# Patient Record
Sex: FEMALE | Race: WHITE | Employment: OTHER | URBAN - METROPOLITAN AREA
[De-identification: names, ages, dates, MRNs, and addresses within clinical notes are randomized per-mention and may not be internally consistent; named-entity substitution may affect disease eponyms.]

---

## 2017-02-10 ENCOUNTER — LAB ENCOUNTER (OUTPATIENT)
Dept: LAB | Age: 32
End: 2017-02-10
Attending: INTERNAL MEDICINE
Payer: MEDICARE

## 2017-02-10 DIAGNOSIS — E78.5 HYPERLIPEMIA: ICD-10-CM

## 2017-02-10 DIAGNOSIS — E13.8 MODY 4 WITH COMPLICATIONS, CONTROLLED (HCC): Primary | ICD-10-CM

## 2017-02-10 LAB
ALBUMIN SERPL-MCNC: 3.9 G/DL (ref 3.5–4.8)
ALP LIVER SERPL-CCNC: 51 U/L (ref 37–98)
ALT SERPL-CCNC: 23 U/L (ref 14–54)
AST SERPL-CCNC: 17 U/L (ref 15–41)
BILIRUB SERPL-MCNC: 0.4 MG/DL (ref 0.1–2)
BUN BLD-MCNC: 20 MG/DL (ref 8–20)
CALCIUM BLD-MCNC: 8.4 MG/DL (ref 8.3–10.3)
CHLORIDE: 105 MMOL/L (ref 101–111)
CHOLEST SMN-MCNC: 81 MG/DL (ref ?–200)
CO2: 25 MMOL/L (ref 22–32)
CREAT BLD-MCNC: 0.55 MG/DL (ref 0.55–1.02)
CREAT UR-SCNC: 85.2 MG/DL
FREE T4: 1.2 NG/DL (ref 0.9–1.8)
GLUCOSE BLD-MCNC: 83 MG/DL (ref 70–99)
HDLC SERPL-MCNC: 50 MG/DL (ref 45–?)
HDLC SERPL: 1.62 {RATIO} (ref ?–4.44)
LDLC SERPL CALC-MCNC: 20 MG/DL (ref ?–130)
M PROTEIN MFR SERPL ELPH: 7 G/DL (ref 6.1–8.3)
MICROALBUMIN UR-MCNC: 1.1 MG/DL
MICROALBUMIN/CREAT 24H UR-RTO: 12.9 UG/MG (ref ?–30)
NONHDLC SERPL-MCNC: 31 MG/DL (ref ?–130)
POTASSIUM SERPL-SCNC: 4 MMOL/L (ref 3.6–5.1)
SODIUM SERPL-SCNC: 139 MMOL/L (ref 136–144)
TRIGLYCERIDES: 56 MG/DL (ref ?–150)
TSI SER-ACNC: 3.42 MIU/ML (ref 0.35–5.5)
VLDL: 11 MG/DL (ref 5–40)

## 2017-02-10 PROCEDURE — 80061 LIPID PANEL: CPT

## 2017-02-10 PROCEDURE — 82043 UR ALBUMIN QUANTITATIVE: CPT

## 2017-02-10 PROCEDURE — 82570 ASSAY OF URINE CREATININE: CPT

## 2017-02-10 PROCEDURE — 84443 ASSAY THYROID STIM HORMONE: CPT

## 2017-02-10 PROCEDURE — 80053 COMPREHEN METABOLIC PANEL: CPT

## 2017-02-10 PROCEDURE — 36415 COLL VENOUS BLD VENIPUNCTURE: CPT

## 2017-02-10 PROCEDURE — 84439 ASSAY OF FREE THYROXINE: CPT

## 2019-04-06 ENCOUNTER — HOSPITAL ENCOUNTER (EMERGENCY)
Age: 34
Discharge: HOME | End: 2019-04-06
Payer: MEDICARE

## 2019-04-06 VITALS
SYSTOLIC BLOOD PRESSURE: 148 MMHG | HEIGHT: 59 IN | RESPIRATION RATE: 20 BRPM | WEIGHT: 160 LBS | TEMPERATURE: 97 F | OXYGEN SATURATION: 99 % | BODY MASS INDEX: 32.25 KG/M2 | HEART RATE: 130 BPM | DIASTOLIC BLOOD PRESSURE: 85 MMHG

## 2019-04-06 NOTE — ED NOTES
Pt leaving before being seen by physician, states they spoke with their doctor Gearldine Dandy and he will see them in the office monday

## 2019-04-06 NOTE — ED INITIAL ASSESSMENT (HPI)
Pt states she went to go put in her hearing aid to her right ear and it \"fell off and got stuck in my ear. They tried getting it out at home, but they couldn't\". Pt denies pain or drainage from ear.

## 2025-06-23 ENCOUNTER — TELEPHONE (OUTPATIENT)
Dept: MAMMOGRAPHY | Facility: HOSPITAL | Age: 40
End: 2025-06-23

## 2025-06-23 NOTE — TELEPHONE ENCOUNTER
Attempted to call patient re: breast wire localization procedure education. Unable to leave a message

## 2025-06-24 ENCOUNTER — TELEPHONE (OUTPATIENT)
Dept: MAMMOGRAPHY | Facility: HOSPITAL | Age: 40
End: 2025-06-24

## 2025-06-24 NOTE — TELEPHONE ENCOUNTER
Attempted to call patient re: breast wire localization education. Message left for patient to call back.

## 2025-06-30 ENCOUNTER — TELEPHONE (OUTPATIENT)
Dept: GENERAL RADIOLOGY | Facility: HOSPITAL | Age: 40
End: 2025-06-30

## 2025-07-01 ENCOUNTER — TELEPHONE (OUTPATIENT)
Dept: MAMMOGRAPHY | Facility: HOSPITAL | Age: 40
End: 2025-07-01

## 2025-07-01 NOTE — TELEPHONE ENCOUNTER
Attempted to reach patient regarding localization procedure education. Message left for patient to call back.

## 2025-07-08 ENCOUNTER — TELEPHONE (OUTPATIENT)
Dept: MAMMOGRAPHY | Facility: HOSPITAL | Age: 40
End: 2025-07-08

## 2025-07-08 NOTE — TELEPHONE ENCOUNTER
Fifth attempt to reach patient regarding localization procedure education. Message left for patient to call back.

## 2025-07-09 RX ORDER — LANCETS
EACH MISCELLANEOUS DAILY
COMMUNITY
Start: 2025-06-03

## 2025-07-09 RX ORDER — BLOOD-GLUCOSE METER
1 EACH MISCELLANEOUS DAILY
COMMUNITY
Start: 2025-07-03

## 2025-07-09 RX ORDER — EMPAGLIFLOZIN 25 MG/1
25 TABLET, FILM COATED ORAL DAILY
COMMUNITY
Start: 2025-05-20

## 2025-07-09 RX ORDER — DIAZEPAM 5 MG/1
5 TABLET ORAL EVERY 30 MIN PRN
OUTPATIENT
Start: 2025-07-09

## 2025-07-14 ENCOUNTER — TELEPHONE (OUTPATIENT)
Dept: MAMMOGRAPHY | Facility: HOSPITAL | Age: 40
End: 2025-07-14

## 2025-07-18 NOTE — PAT NURSING NOTE
Spoke w pt mom earlier today. She states does not have guardianship papers at this time. Faxed Health Care Surrogate Physician Certification document to surgeon/ office.  Received document signed per MD and scanned into media tab for Monday surgery.

## 2025-07-21 ENCOUNTER — HOSPITAL ENCOUNTER (OUTPATIENT)
Dept: MAMMOGRAPHY | Facility: HOSPITAL | Age: 40
Setting detail: HOSPITAL OUTPATIENT SURGERY
Discharge: HOME | End: 2025-07-21
Attending: SURGERY | Admitting: SURGERY
Payer: MEDICARE

## 2025-07-21 ENCOUNTER — HOSPITAL ENCOUNTER (OUTPATIENT)
Facility: HOSPITAL | Age: 40
Setting detail: HOSPITAL OUTPATIENT SURGERY
Discharge: HOME | End: 2025-07-21
Attending: SURGERY | Admitting: SURGERY
Payer: MEDICARE

## 2025-07-21 ENCOUNTER — ANESTHESIA (OUTPATIENT)
Dept: SURGERY | Facility: HOSPITAL | Age: 40
End: 2025-07-21
Payer: MEDICARE

## 2025-07-21 ENCOUNTER — ANESTHESIA EVENT (OUTPATIENT)
Dept: SURGERY | Facility: HOSPITAL | Age: 40
End: 2025-07-21
Payer: MEDICARE

## 2025-07-21 VITALS
OXYGEN SATURATION: 98 % | TEMPERATURE: 99 F | SYSTOLIC BLOOD PRESSURE: 129 MMHG | DIASTOLIC BLOOD PRESSURE: 86 MMHG | BODY MASS INDEX: 15.51 KG/M2 | RESPIRATION RATE: 16 BRPM | HEIGHT: 58 IN | WEIGHT: 73.88 LBS | HEART RATE: 117 BPM

## 2025-07-21 DIAGNOSIS — N63.10 MASS OF RIGHT BREAST, UNSPECIFIED QUADRANT: Primary | ICD-10-CM

## 2025-07-21 DIAGNOSIS — N60.99 ATYPICAL DUCTAL HYPERPLASIA OF BREAST: ICD-10-CM

## 2025-07-21 LAB
B-HCG UR QL: NEGATIVE
GLUCOSE BLD-MCNC: 120 MG/DL (ref 70–99)
GLUCOSE BLD-MCNC: 198 MG/DL (ref 70–99)

## 2025-07-21 PROCEDURE — 82962 GLUCOSE BLOOD TEST: CPT

## 2025-07-21 PROCEDURE — 19281 PERQ DEVICE BREAST 1ST IMAG: CPT | Performed by: SURGERY

## 2025-07-21 PROCEDURE — 81025 URINE PREGNANCY TEST: CPT

## 2025-07-21 PROCEDURE — 88307 TISSUE EXAM BY PATHOLOGIST: CPT | Performed by: SURGERY

## 2025-07-21 PROCEDURE — 76098 X-RAY EXAM SURGICAL SPECIMEN: CPT | Performed by: SURGERY

## 2025-07-21 PROCEDURE — 19282 PERQ DEVICE BREAST EA IMAG: CPT | Performed by: SURGERY

## 2025-07-21 PROCEDURE — 88342 IMHCHEM/IMCYTCHM 1ST ANTB: CPT | Performed by: SURGERY

## 2025-07-21 PROCEDURE — 88360 TUMOR IMMUNOHISTOCHEM/MANUAL: CPT | Performed by: SURGERY

## 2025-07-21 RX ORDER — NALOXONE HYDROCHLORIDE 0.4 MG/ML
0.08 INJECTION, SOLUTION INTRAMUSCULAR; INTRAVENOUS; SUBCUTANEOUS AS NEEDED
Status: DISCONTINUED | OUTPATIENT
Start: 2025-07-21 | End: 2025-07-21

## 2025-07-21 RX ORDER — ONDANSETRON 2 MG/ML
INJECTION INTRAMUSCULAR; INTRAVENOUS AS NEEDED
Status: DISCONTINUED | OUTPATIENT
Start: 2025-07-21 | End: 2025-07-21 | Stop reason: SURG

## 2025-07-21 RX ORDER — LIDOCAINE HYDROCHLORIDE AND EPINEPHRINE 10; 10 MG/ML; UG/ML
INJECTION, SOLUTION INFILTRATION; PERINEURAL AS NEEDED
Status: DISCONTINUED | OUTPATIENT
Start: 2025-07-21 | End: 2025-07-21 | Stop reason: HOSPADM

## 2025-07-21 RX ORDER — HYDROCODONE BITARTRATE AND ACETAMINOPHEN 5; 325 MG/1; MG/1
2 TABLET ORAL ONCE AS NEEDED
Status: DISCONTINUED | OUTPATIENT
Start: 2025-07-21 | End: 2025-07-21

## 2025-07-21 RX ORDER — SODIUM CHLORIDE, SODIUM LACTATE, POTASSIUM CHLORIDE, CALCIUM CHLORIDE 600; 310; 30; 20 MG/100ML; MG/100ML; MG/100ML; MG/100ML
INJECTION, SOLUTION INTRAVENOUS CONTINUOUS
Status: DISCONTINUED | OUTPATIENT
Start: 2025-07-21 | End: 2025-07-21

## 2025-07-21 RX ORDER — HYDROCODONE BITARTRATE AND ACETAMINOPHEN 5; 325 MG/1; MG/1
1 TABLET ORAL EVERY 4 HOURS PRN
Qty: 20 TABLET | Refills: 0 | Status: SHIPPED | OUTPATIENT
Start: 2025-07-21

## 2025-07-21 RX ORDER — NICOTINE POLACRILEX 4 MG
15 LOZENGE BUCCAL
Status: DISCONTINUED | OUTPATIENT
Start: 2025-07-21 | End: 2025-07-21 | Stop reason: HOSPADM

## 2025-07-21 RX ORDER — MIDAZOLAM HYDROCHLORIDE 1 MG/ML
1 INJECTION INTRAMUSCULAR; INTRAVENOUS EVERY 5 MIN PRN
Status: DISCONTINUED | OUTPATIENT
Start: 2025-07-21 | End: 2025-07-21

## 2025-07-21 RX ORDER — NICOTINE POLACRILEX 4 MG
30 LOZENGE BUCCAL
Status: DISCONTINUED | OUTPATIENT
Start: 2025-07-21 | End: 2025-07-21 | Stop reason: HOSPADM

## 2025-07-21 RX ORDER — ACETAMINOPHEN 500 MG
1000 TABLET ORAL ONCE AS NEEDED
Status: DISCONTINUED | OUTPATIENT
Start: 2025-07-21 | End: 2025-07-21

## 2025-07-21 RX ORDER — HYDROMORPHONE HYDROCHLORIDE 1 MG/ML
0.2 INJECTION, SOLUTION INTRAMUSCULAR; INTRAVENOUS; SUBCUTANEOUS EVERY 5 MIN PRN
Status: DISCONTINUED | OUTPATIENT
Start: 2025-07-21 | End: 2025-07-21

## 2025-07-21 RX ORDER — DEXTROSE MONOHYDRATE 25 G/50ML
50 INJECTION, SOLUTION INTRAVENOUS
Status: DISCONTINUED | OUTPATIENT
Start: 2025-07-21 | End: 2025-07-21 | Stop reason: HOSPADM

## 2025-07-21 RX ORDER — PROCHLORPERAZINE EDISYLATE 5 MG/ML
5 INJECTION INTRAMUSCULAR; INTRAVENOUS EVERY 8 HOURS PRN
Status: DISCONTINUED | OUTPATIENT
Start: 2025-07-21 | End: 2025-07-21

## 2025-07-21 RX ORDER — BUPIVACAINE HYDROCHLORIDE 5 MG/ML
INJECTION, SOLUTION EPIDURAL; INTRACAUDAL; PERINEURAL AS NEEDED
Status: DISCONTINUED | OUTPATIENT
Start: 2025-07-21 | End: 2025-07-21 | Stop reason: HOSPADM

## 2025-07-21 RX ORDER — DIPHENHYDRAMINE HYDROCHLORIDE 50 MG/ML
12.5 INJECTION, SOLUTION INTRAMUSCULAR; INTRAVENOUS AS NEEDED
Status: DISCONTINUED | OUTPATIENT
Start: 2025-07-21 | End: 2025-07-21

## 2025-07-21 RX ORDER — ONDANSETRON 2 MG/ML
4 INJECTION INTRAMUSCULAR; INTRAVENOUS EVERY 6 HOURS PRN
Status: DISCONTINUED | OUTPATIENT
Start: 2025-07-21 | End: 2025-07-21

## 2025-07-21 RX ORDER — LIDOCAINE HYDROCHLORIDE 10 MG/ML
INJECTION, SOLUTION EPIDURAL; INFILTRATION; INTRACAUDAL; PERINEURAL AS NEEDED
Status: DISCONTINUED | OUTPATIENT
Start: 2025-07-21 | End: 2025-07-21 | Stop reason: SURG

## 2025-07-21 RX ORDER — INSULIN ASPART 100 [IU]/ML
INJECTION, SOLUTION INTRAVENOUS; SUBCUTANEOUS ONCE
Status: DISCONTINUED | OUTPATIENT
Start: 2025-07-21 | End: 2025-07-21

## 2025-07-21 RX ORDER — MEPERIDINE HYDROCHLORIDE 25 MG/ML
12.5 INJECTION INTRAMUSCULAR; INTRAVENOUS; SUBCUTANEOUS AS NEEDED
Status: DISCONTINUED | OUTPATIENT
Start: 2025-07-21 | End: 2025-07-21

## 2025-07-21 RX ORDER — SCOPOLAMINE 1 MG/3D
1 PATCH, EXTENDED RELEASE TRANSDERMAL ONCE
Status: DISCONTINUED | OUTPATIENT
Start: 2025-07-21 | End: 2025-07-21 | Stop reason: HOSPADM

## 2025-07-21 RX ORDER — HYDROMORPHONE HYDROCHLORIDE 1 MG/ML
0.4 INJECTION, SOLUTION INTRAMUSCULAR; INTRAVENOUS; SUBCUTANEOUS EVERY 5 MIN PRN
Status: DISCONTINUED | OUTPATIENT
Start: 2025-07-21 | End: 2025-07-21

## 2025-07-21 RX ORDER — DEXAMETHASONE SODIUM PHOSPHATE 4 MG/ML
VIAL (ML) INJECTION AS NEEDED
Status: DISCONTINUED | OUTPATIENT
Start: 2025-07-21 | End: 2025-07-21 | Stop reason: SURG

## 2025-07-21 RX ORDER — ACETAMINOPHEN 500 MG
1000 TABLET ORAL ONCE
Status: DISCONTINUED | OUTPATIENT
Start: 2025-07-21 | End: 2025-07-21 | Stop reason: HOSPADM

## 2025-07-21 RX ORDER — LABETALOL HYDROCHLORIDE 5 MG/ML
5 INJECTION, SOLUTION INTRAVENOUS EVERY 5 MIN PRN
Status: DISCONTINUED | OUTPATIENT
Start: 2025-07-21 | End: 2025-07-21

## 2025-07-21 RX ORDER — HYDROCODONE BITARTRATE AND ACETAMINOPHEN 5; 325 MG/1; MG/1
1 TABLET ORAL ONCE AS NEEDED
Status: DISCONTINUED | OUTPATIENT
Start: 2025-07-21 | End: 2025-07-21

## 2025-07-21 RX ORDER — HYDROMORPHONE HYDROCHLORIDE 1 MG/ML
0.6 INJECTION, SOLUTION INTRAMUSCULAR; INTRAVENOUS; SUBCUTANEOUS EVERY 5 MIN PRN
Status: DISCONTINUED | OUTPATIENT
Start: 2025-07-21 | End: 2025-07-21

## 2025-07-21 RX ADMIN — ONDANSETRON 4 MG: 2 INJECTION INTRAMUSCULAR; INTRAVENOUS at 13:15:00

## 2025-07-21 RX ADMIN — SODIUM CHLORIDE, SODIUM LACTATE, POTASSIUM CHLORIDE, CALCIUM CHLORIDE: 600; 310; 30; 20 INJECTION, SOLUTION INTRAVENOUS at 13:19:00

## 2025-07-21 RX ADMIN — LIDOCAINE HYDROCHLORIDE 5 ML: 10 INJECTION, SOLUTION EPIDURAL; INFILTRATION; INTRACAUDAL; PERINEURAL at 12:54:00

## 2025-07-21 RX ADMIN — SODIUM CHLORIDE, SODIUM LACTATE, POTASSIUM CHLORIDE, CALCIUM CHLORIDE: 600; 310; 30; 20 INJECTION, SOLUTION INTRAVENOUS at 12:48:00

## 2025-07-21 RX ADMIN — DEXAMETHASONE SODIUM PHOSPHATE 4 MG: 4 MG/ML VIAL (ML) INJECTION at 12:56:00

## 2025-07-21 NOTE — ANESTHESIA POSTPROCEDURE EVALUATION
[facility]    Aliya Mata Patient Status:  Hospital Outpatient Surgery   Age/Gender 40 year old female MRN [id number]   Location [facility] POST ANESTHESIA CARE UNIT Attending Juno Reza MD   Hosp Day # 0 PCP Em Yoder DO       Anesthesia Post-op Note    RIGHT BREAST LUMPECTOMY WITH WIRE LOCALIZATION TWO WIRES (TWO SITES)    Procedure Summary       Date: 07/21/25 Room / Location:  MAIN OR 08 / [facility] MAIN OR    Anesthesia Start: 1248 Anesthesia Stop: 1338    Procedure: RIGHT BREAST LUMPECTOMY WITH WIRE LOCALIZATION TWO WIRES (TWO SITES) (Breast) Diagnosis: (ATYPICAL DUCTAL HYPERPLASIA RIGHT BREAST)    Surgeons: Juno Reza MD Anesthesiologist: Christophe Renteria MD    Anesthesia Type: general ASA Status: 2            Anesthesia Type: general    Vitals Value Taken Time   /87 07/21/25 13:55   Temp 98.6 °F (37 °C) 07/21/25 14:00   Pulse 110 07/21/25 14:00   Resp 19 07/21/25 14:00   SpO2 98 % 07/21/25 14:00           Patient Location: PACU    Anesthesia Type: general    Airway Patency: patent and extubated    Postop Pain Control: adequate    Mental Status: preanesthetic baseline    Nausea/Vomiting: none    Cardiopulmonary/Hydration status: stable euvolemic    Complications: no apparent anesthesia related complications    Postop vital signs: stable    Dental Exam: Unchanged from Preop    Patient to be discharged from PACU when criteria met.

## 2025-07-21 NOTE — IMAGING NOTE
Assisted  with mammography guided needle localization of the right breast x two sites  Aliya Mata identified with spelling of name and date of birth.  Medications and allergies reviewed.  NKDA reported.     History: right breast-atypical ductal hyperplasia   Surgery: RIGHT BREAST LUMPECTOMY WITH WIRE LOCALIZATION TWO WIRES (TWO SITES)     Order verified.  Per [facility]- Aliya Mata has a Health Care Surrogate. See media for health care surrogate physician certification.  1033: phone conversation: needle localization procedure of the breast discussed very briefly with Health Care Surrogate Dina Avilapau (Mother)  Dina Durantmj verbalized understanding   Dina Vinod firmly informed this nurse that her daughter Aliya Mata provides self consent for health care procedures.  Procedure explained including risks of infection and bleeding to Aliya Mata. Ms. Mata verbalized understanding. No questions at this time.   1035: Written consent obtained by Aliya Mata.     1042: Scans taken by Elaine- mammography technologist    1045: Dr. Wray present  Dr. Wray briefly discussed breast localization procedure with Aliya Mata. Ms. Mata verbalized understanding and agreement to proceed. No questions.     1046: Time out complete.    Right Breast  1045: Site prepped in a sterile manner.   1047: Lidocaine administered for anesthetic affect.  1047: [place] 20G x 7.5cm needle placed right breast cork shaped marker  Right Breast   1045: Site prepped in a sterile manner.   1048: Lidocaine administered for anesthetic affect.  1048: [place] 20G x 5cm needle placed right breast buckle shaped marker    Emotional support provided.  Aliya Mata tolerated procedure well.     Sites cleaned.  Wires secured with sterile 4x4 gauze dressing, paper tape, and a styrofoam cup    Aliya Mata transported via wheelchair to pre-op/surgery holding in stable condition. Ms. Mata without complaints or concerns at this  time.

## 2025-07-21 NOTE — DISCHARGE INSTRUCTIONS
Home Care Instructions  BREAST BIOPSY      1. You may have an ace wrap around your chest. This may be removed in 24 hours. You have a clear plastic dressing called a “Tegaderm” covering your incision. You may shower or bathe right over this. Leave this in place until you are seen back in the office. You may note some drainage underneath this dressing. Pink or bloody discharge is to be expected and is acceptable in small amounts.    2. Anticipate some mild to moderate swelling and bruising. This is normal. If this appears excessive please contact the office  [phone]    3. The pathology or biopsy results take 2-3 days to process. Please call the office 2 days after surgery for the results and further instructions will be given. Also at that time make an appointment for approximately one week after surgery. At this office visit the dressing will be removed, the wound inspected, and the stitches removed. Also during this visit there will be a complete discussion regarding the biopsy results.    4. You have been given a prescription for a pain reliever. Please take this as necessary. If you are having minimal discomfort, regular Tylenol or aspirin could be used as necessary. You may or may not choose to continue to wear your bra. Please use according to your own comfort level. You may start/continue with NSAIDS (antiinflammatories) in 2 days or as directed     5. You may eat a diet as tolerated    6. Signs and symptoms of wound problems include a yellowish or malodorous drainage underneath the bandage, increasing redness, or increasing pain and tenderness. If these occur please call immediately.    7. Please call if you have any problems or questions. One of my partners or I will be available at anytime day or night by calling our office number  [phone]      [facility]   [phone]

## 2025-07-21 NOTE — H&P
Aliya Mata is a 39 year old female who presents for a breast evaluation   Recent mammogram and ultrasound reveals 4 abnormalities right breast and 1 on the left  All 5 sites were biopsied  2 areas right breast were positive for ADH  All other were benign  Here with mother     Past Medical History:   Diagnosis Date   Anxiety   Asthma   Breast injury 2010   dog bite   Diabetes (HCC)   Essential hypertension   Mild episode of recurrent major depressive disorder (HCC) 09/25/2023   Scoliosis     Past Surgical History:   Procedure Laterality Date   ANESTHESIA EYE CORNEAL TRANSPLANT   OTHER SURGICAL HISTORY Left 1986   cornea transplant x2   OTHER SURGICAL HISTORY Left 1991   foot     Current Outpatient Medications   Medication Sig Dispense Refill   metFORMIN  MG Oral Tablet 24 Hr Take 2 tablets (1,000 mg total) by mouth 2 (two) times daily with meals. 180 tablet 1   Semaglutide, 2 MG/DOSE, (OZEMPIC, 2 MG/DOSE,) 8 MG/3ML Subcutaneous Solution Pen-injector Inject 2 mg into the skin once a week. 9 mL 0   Accu-Chek FastClix Lancets Does not apply Misc Check blood sugars daily 102 each 3   Glucose Blood (ACCU-CHEK GUIDE TEST) In Vitro Strip Check blood sugar daily 100 strip 3   Continuous Glucose Sensor (DEXCOM G7 SENSOR) Does not apply Misc 1 each Every 10 days. 9 each 1   Empagliflozin (JARDIANCE) 25 MG Oral Tab Take 1 tablet by mouth daily. 90 tablet 1   lisinopril 5 MG Oral Tab Take 1 tablet (5 mg total) by mouth daily. 100 tablet 3   montelukast 10 MG Oral Tab Take 1 tablet (10 mg total) by mouth nightly. 90 tablet 3   citalopram 20 MG Oral Tab Take 1 tablet (20 mg total) by mouth daily. 90 tablet 3   Blood Glucose Monitoring Suppl (ACCU-CHEK GUIDE) w/Device Does not apply Kit Check blood sugars daily 1 kit 0     No Known Allergies    Family History   Problem Relation Age of Onset   Breast Cancer Maternal Aunt 51 - 59   51-59y     Social History  Tobacco Use  Smoking status: Never  Smokeless tobacco:  Never  Vaping Use  Vaping status: Never Used  Alcohol use: Never  Drug use: Never    ROS:    10 point review performed with pertinent positives and negatives per HPI    EXAM:    GENERAL: well developed, well nourished female, in no apparent distress  SKIN: anicteric  HEENT: normocephalic; sclera anicteric  NECK: supple, no JVD  RESPIRATORY: clear to auscultation  CARDIOVASCULAR: RRR  ABDOMEN: normal active BS, soft and no tenderness, no mass  LYMPHATIC: no lymphadenopathy  EXTREMITIES: no cyanosis or edema  BREASTS: post biopsy changes both breast  No suspicious mass either breast  Axilla negative    BREAST US BIOPSY 2 SITES (CPT=19083/24882)  Addendum Date: 6/11/2025  DATE OF SERVICE: 05.27.2025 Right breast 12:00 posterior calcifications core biopsy (cork clip): Atypical ductal hyperplasia. This report electronically signed by Dr. Jefry Ragsdale 6/11/2025 9:59 AM    Addendum Date: 5/30/2025  DATE OF SERVICE: 05.27.2025 FINAL PATHOLOGIC DIAGNOSIS Right breast 12:00 posterior calcifications core biopsy: Atypical ductal hyperplasia. Left breast 5:00 3 cm from nipple core biopsy: Fibroadenoma. Right breast 11:00 retroareolar core biopsy: Fibroadenoma. These results are benign and concordant. 2 additional clusters of calcifications in the anterior right breast should be sampled with stereotactic biopsy given the results of atypia. Surgical consultation. These results will be communicated to the patient by our breast care coordinator. This can be localized under stereotactic guidance. This report electronically signed by Dr. Jefry Ragsdale 5/30/2025 11:19 AM    Result Date: 6/11/2025  DATE OF SERVICE: 05.27.2025 EXAM: BREAST US BIOPSY 2 SITES (CPT=19083/82245), BREAST STEREO BIOPSY 1 SITE (CPT=19081), DIAG MAMM,DIGITAL,LILINAA CLIP [facility](CPT=77066) CLINICIAN'S HISTORY: 2 site right and one site left biopsy COMPARISON: 5/15/2025 TECHNIQUE: Ultrasound-guided core needle biopsy of both breasts with marker placement.  Stereotactic biopsy of the right breast. Post procedure mammogram. ---------------------------------------- PROCEDURE: Informed written and verbal consent has been obtained. The risks, benefits and alternatives to the procedures have been discussed with the patient. The patient or designee understands and has signed the consent. Timeout was performed. Preprocedure ultrasound demonstrated a mass in the right breast at the 11:00 position retroareolar region and in the left breast at the 5:00 position 3 cm from the nipple. A sterile surgical supply tray was used for the procedure. The patient was positioned on the ultrasound table with the ipsilateral arm raised over the head. Right breast 11:00: The breast was prepped with sterile technique. Under continuous ultrasound guidance, 1% Lidocaine buffered with sodium bicarbonate was injected for superficial anesthesia and to anesthetize the area surrounding the ultrasonographic lesion. Additional anesthesia with 1% Lidocaine with Epinephrine was also used within the deep tissues. A small skin nick was made with a # 11 scalpel blade. Under continuous ultrasound guidance, utilizing the Marquee 14 gauge device, 3 cores were obtained of the lesion. The device was then withdrawn. Under continuous ultrasound guidance, an X clip was placed into the central aspect of the lesion. The needle was then withdrawn. Left breast 5:00: The breast was prepped with sterile technique. Under continuous ultrasound guidance, 1% Lidocaine buffered with sodium bicarbonate was injected for superficial anesthesia and to anesthetize the area surrounding the ultrasonographic lesion. Additional anesthesia with 1% Lidocaine with Epinephrine was also used within the deep tissues. A small skin nick was made with a # 11 scalpel blade. Under continuous ultrasound guidance, utilizing the Marquee 14 gauge device, 3 cores were obtained of the lesion. The device was then withdrawn. Under continuous ultrasound  guidance, a Q clip was placed into the central aspect of the lesion. The needle was then withdrawn. The skin incisions were approximated with Steri-Strips. A gauze pressure dressing was placed over the biopsy site and an ice pack and bra were subsequently positioned over the dressings. Right breast stereotactic biopsy at 12:00 posterior depth. The patient was then positioned within the upright Affirm biopsy device and tomosynthesis images were obtained of the calcifications in the right upper breast, 12:00, posterior depth. After the appropriate images were obtained, the breast lesion was targeted from the digital tomosynthesis images with computer assistance. Z position safety calculations were calculated and confirmed. The patient was prepped with sterile technique. 1% lidocaine was instilled to anesthetize the skin and 2% lidocaine with epinephrine was instilled to anesthetize the area surrounding the targeted lesion. A tiny incision was made in the skin with a scalpel. A 9-gauge Brevera biopsy probe was inserted through the incision to the coordinates of the lesion derived from the tomosynthesis calculations. Tomosynthesis images were obtained to confirm accurate positioning of the biopsy probe. A total of 3 tissue specimens were extracted. The tissue specimens were radiographed using magnification technique. Specimen Radiograph: Microcalcifications were present in 3 cores. Post biopsy 2 view mammogram demonstrated the clips in the expected location. Breast Density C: The breasts are heterogeneously dense, which may obscure small masses. Post-biopsy instructions were reviewed and a copy given to the patient. A pathology request was made. She was discharged in a stable condition with all questions answered. ----------------------------------------    IMPRESSION: 1. Successful ultrasound guided core needle biopsy of both breasts with clip placement as detailed above. Pathology is currently pending. 2. Successful  stereotactic biopsy of the right breast with clip placement as detailed above. Pathology is currently pending. ASSESSMENT: BIRADS - Post procedure mammogram for marker placement. This exam was interpreted by Jefry Ragsdale M.D. [facility], [place], [address] This report electronically signed by Dr. Jefry Ragsdale 5/27/2025 10:36 AM    BREAST STEREO BIOPSY 1 SITE (CPT=19081)  Addendum Date: 6/11/2025  DATE OF SERVICE: 05.27.2025 Right breast 12:00 posterior calcifications core biopsy (cork clip): Atypical ductal hyperplasia. This report electronically signed by Dr. Jefry Ragsdale 6/11/2025 9:59 AM    Addendum Date: 5/30/2025  DATE OF SERVICE: 05.27.2025 FINAL PATHOLOGIC DIAGNOSIS Right breast 12:00 posterior calcifications core biopsy: Atypical ductal hyperplasia. Left breast 5:00 3 cm from nipple core biopsy: Fibroadenoma. Right breast 11:00 retroareolar core biopsy: Fibroadenoma. These results are benign and concordant. 2 additional clusters of calcifications in the anterior right breast should be sampled with stereotactic biopsy given the results of atypia. Surgical consultation. These results will be communicated to the patient by our breast care coordinator. This can be localized under stereotactic guidance. This report electronically signed by Dr. Jefry Ragsdale 5/30/2025 11:19 AM    Result Date: 6/11/2025  DATE OF SERVICE: 05.27.2025 EXAM: BREAST US BIOPSY 2 SITES (CPT=19083/01928), BREAST STEREO BIOPSY 1 SITE (CPT=19081), DIAG MAMM,DIGITAL,LILIANA CLIP [facility](CPT=77066) CLINICIAN'S HISTORY: 2 site right and one site left biopsy COMPARISON: 5/15/2025 TECHNIQUE: Ultrasound-guided core needle biopsy of both breasts with marker placement. Stereotactic biopsy of the right breast. Post procedure mammogram. ---------------------------------------- PROCEDURE: Informed written and verbal consent has been obtained. The risks, benefits and alternatives to the procedures have been  discussed with the patient. The patient or designee understands and has signed the consent. Timeout was performed. Preprocedure ultrasound demonstrated a mass in the right breast at the 11:00 position retroareolar region and in the left breast at the 5:00 position 3 cm from the nipple. A sterile surgical supply tray was used for the procedure. The patient was positioned on the ultrasound table with the ipsilateral arm raised over the head. Right breast 11:00: The breast was prepped with sterile technique. Under continuous ultrasound guidance, 1% Lidocaine buffered with sodium bicarbonate was injected for superficial anesthesia and to anesthetize the area surrounding the ultrasonographic lesion. Additional anesthesia with 1% Lidocaine with Epinephrine was also used within the deep tissues. A small skin nick was made with a # 11 scalpel blade. Under continuous ultrasound guidance, utilizing the Marquee 14 gauge device, 3 cores were obtained of the lesion. The device was then withdrawn. Under continuous ultrasound guidance, an X clip was placed into the central aspect of the lesion. The needle was then withdrawn. Left breast 5:00: The breast was prepped with sterile technique. Under continuous ultrasound guidance, 1% Lidocaine buffered with sodium bicarbonate was injected for superficial anesthesia and to anesthetize the area surrounding the ultrasonographic lesion. Additional anesthesia with 1% Lidocaine with Epinephrine was also used within the deep tissues. A small skin nick was made with a # 11 scalpel blade. Under continuous ultrasound guidance, utilizing the Marquee 14 gauge device, 3 cores were obtained of the lesion. The device was then withdrawn. Under continuous ultrasound guidance, a Q clip was placed into the central aspect of the lesion. The needle was then withdrawn. The skin incisions were approximated with Steri-Strips. A gauze pressure dressing was placed over the biopsy site and an ice pack and bra  were subsequently positioned over the dressings. Right breast stereotactic biopsy at 12:00 posterior depth. The patient was then positioned within the upright Affirm biopsy device and tomosynthesis images were obtained of the calcifications in the right upper breast, 12:00, posterior depth. After the appropriate images were obtained, the breast lesion was targeted from the digital tomosynthesis images with computer assistance. Z position safety calculations were calculated and confirmed. The patient was prepped with sterile technique. 1% lidocaine was instilled to anesthetize the skin and 2% lidocaine with epinephrine was instilled to anesthetize the area surrounding the targeted lesion. A tiny incision was made in the skin with a scalpel. A 9-gauge Brevera biopsy probe was inserted through the incision to the coordinates of the lesion derived from the tomosynthesis calculations. Tomosynthesis images were obtained to confirm accurate positioning of the biopsy probe. A total of 3 tissue specimens were extracted. The tissue specimens were radiographed using magnification technique. Specimen Radiograph: Microcalcifications were present in 3 cores. Post biopsy 2 view mammogram demonstrated the clips in the expected location. Breast Density C: The breasts are heterogeneously dense, which may obscure small masses. Post-biopsy instructions were reviewed and a copy given to the patient. A pathology request was made. She was discharged in a stable condition with all questions answered. ----------------------------------------    IMPRESSION: 1. Successful ultrasound guided core needle biopsy of both breasts with clip placement as detailed above. Pathology is currently pending. 2. Successful stereotactic biopsy of the right breast with clip placement as detailed above. Pathology is currently pending. ASSESSMENT: BIRADS - Post procedure mammogram for marker placement. This exam was interpreted by ANTONIA Feldman  [facility] [place], [place], [address] This report electronically signed by Dr. Jefry Ragsdale 5/27/2025 10:36 AM    DIAG MAMM,DIGITAL,LILIANA CLIP [facility](CPT=77066)  Addendum Date: 6/11/2025  DATE OF SERVICE: 05.27.2025 Right breast 12:00 posterior calcifications core biopsy (cork clip): Atypical ductal hyperplasia. This report electronically signed by Dr. Jefry Ragsdale 6/11/2025 9:59 AM    Addendum Date: 5/30/2025  DATE OF SERVICE: 05.27.2025 FINAL PATHOLOGIC DIAGNOSIS Right breast 12:00 posterior calcifications core biopsy: Atypical ductal hyperplasia. Left breast 5:00 3 cm from nipple core biopsy: Fibroadenoma. Right breast 11:00 retroareolar core biopsy: Fibroadenoma. These results are benign and concordant. 2 additional clusters of calcifications in the anterior right breast should be sampled with stereotactic biopsy given the results of atypia. Surgical consultation. These results will be communicated to the patient by our breast care coordinator. This can be localized under stereotactic guidance. This report electronically signed by Dr. Jefry Ragsdale 5/30/2025 11:19 AM    Result Date: 6/11/2025  DATE OF SERVICE: 05.27.2025 EXAM: BREAST US BIOPSY 2 SITES (CPT=19083/25715), BREAST STEREO BIOPSY 1 SITE (CPT=19081), DIAG MAMM,DIGITAL,LILIANA CLIP [facility](CPT=77066) CLINICIAN'S HISTORY: 2 site right and one site left biopsy COMPARISON: 5/15/2025 TECHNIQUE: Ultrasound-guided core needle biopsy of both breasts with marker placement. Stereotactic biopsy of the right breast. Post procedure mammogram. ---------------------------------------- PROCEDURE: Informed written and verbal consent has been obtained. The risks, benefits and alternatives to the procedures have been discussed with the patient. The patient or designee understands and has signed the consent. Timeout was performed. Preprocedure ultrasound demonstrated a mass in the right breast at the 11:00 position retroareolar region and in the left  breast at the 5:00 position 3 cm from the nipple. A sterile surgical supply tray was used for the procedure. The patient was positioned on the ultrasound table with the ipsilateral arm raised over the head. Right breast 11:00: The breast was prepped with sterile technique. Under continuous ultrasound guidance, 1% Lidocaine buffered with sodium bicarbonate was injected for superficial anesthesia and to anesthetize the area surrounding the ultrasonographic lesion. Additional anesthesia with 1% Lidocaine with Epinephrine was also used within the deep tissues. A small skin nick was made with a # 11 scalpel blade. Under continuous ultrasound guidance, utilizing the Marquee 14 gauge device, 3 cores were obtained of the lesion. The device was then withdrawn. Under continuous ultrasound guidance, an X clip was placed into the central aspect of the lesion. The needle was then withdrawn. Left breast 5:00: The breast was prepped with sterile technique. Under continuous ultrasound guidance, 1% Lidocaine buffered with sodium bicarbonate was injected for superficial anesthesia and to anesthetize the area surrounding the ultrasonographic lesion. Additional anesthesia with 1% Lidocaine with Epinephrine was also used within the deep tissues. A small skin nick was made with a # 11 scalpel blade. Under continuous ultrasound guidance, utilizing the Marquee 14 gauge device, 3 cores were obtained of the lesion. The device was then withdrawn. Under continuous ultrasound guidance, a Q clip was placed into the central aspect of the lesion. The needle was then withdrawn. The skin incisions were approximated with Steri-Strips. A gauze pressure dressing was placed over the biopsy site and an ice pack and bra were subsequently positioned over the dressings. Right breast stereotactic biopsy at 12:00 posterior depth. The patient was then positioned within the upright Affirm biopsy device and tomosynthesis images were obtained of the  calcifications in the right upper breast, 12:00, posterior depth. After the appropriate images were obtained, the breast lesion was targeted from the digital tomosynthesis images with computer assistance. Z position safety calculations were calculated and confirmed. The patient was prepped with sterile technique. 1% lidocaine was instilled to anesthetize the skin and 2% lidocaine with epinephrine was instilled to anesthetize the area surrounding the targeted lesion. A tiny incision was made in the skin with a scalpel. A 9-gauge Brevera biopsy probe was inserted through the incision to the coordinates of the lesion derived from the tomosynthesis calculations. Tomosynthesis images were obtained to confirm accurate positioning of the biopsy probe. A total of 3 tissue specimens were extracted. The tissue specimens were radiographed using magnification technique. Specimen Radiograph: Microcalcifications were present in 3 cores. Post biopsy 2 view mammogram demonstrated the clips in the expected location. Breast Density C: The breasts are heterogeneously dense, which may obscure small masses. Post-biopsy instructions were reviewed and a copy given to the patient. A pathology request was made. She was discharged in a stable condition with all questions answered. ----------------------------------------    IMPRESSION: 1. Successful ultrasound guided core needle biopsy of both breasts with clip placement as detailed above. Pathology is currently pending. 2. Successful stereotactic biopsy of the right breast with clip placement as detailed above. Pathology is currently pending. ASSESSMENT: BIRADS - Post procedure mammogram for marker placement. This exam was interpreted by Jefry Ragsdale M.D. [facility] [place], [place], [address] This report electronically signed by Dr. Jefry Ragsdale 5/27/2025 10:36 AM    BREAST STEREO BIOPSY 2 SITES (CPT=19081/28523)  Addendum Date: 6/6/2025  DATE OF SERVICE:  06.03.2025 FINAL PATHOLOGIC DIAGNOSIS Right breast 12:00 mid depth stereotactic core biopsy (buckle): Atypical ductal hyperplasia. Right breast 11:00 stereotactic core biopsy (top hat): Columnar cell change with associated calcifications. These results are benign and concordant. A breast surgical consultation is recommended. These results will be communicated to the patient by our breast care coordinator. This can be localized under stereotactic guidance along with additional area of biopsy proven atypia more posteriorly (cork clip). This report electronically signed by Dr. Jefry Ragsdale 6/6/2025 8:44 AM    Result Date: 6/6/2025  DATE OF SERVICE: 06.03.2025 EXAM: DIAG MAMM,DIGITAL,UNI RT CLIP [facility] (CPT=77065), BREAST STEREO BIOPSY 2 SITES (CPT=19081/18746) HISTORY: 2 site right breast biopsy ---------------------------------------- PROCEDURE: After explaining the risks, benefits, and alternatives to the patient, informed, written and verbal consent was obtained. Timeout was performed. Right breast 12:00 mid buckle clip: The patient was then positioned within the upright Affirm biopsy device and tomosynthesis images were obtained of the calcifications in the right upper breast, 12:00, mid. After the appropriate images were obtained, the breast lesion was targeted from the digital tomosynthesis images with computer assistance. Z position safety calculations were calculated and confirmed. The patient was prepped with sterile technique. 1% lidocaine was instilled to anesthetize the skin and 2% lidocaine with epinephrine was instilled to anesthetize the area surrounding the targeted lesion. A tiny incision was made in the skin with a scalpel. A 9-gauge Brevera biopsy probe was inserted through the incision to the coordinates of the lesion derived from the tomosynthesis calculations. Tomosynthesis images were obtained to confirm accurate positioning of the biopsy probe. A total of 2 tissue specimens were extracted. The  tissue specimens were radiographed using magnification technique. Specimen Radiograph: Microcalcifications were present in 2 cores. A buckle clip was placed at the biopsy site. Right breast 11:00 mid ectopic clip: The patient was then positioned within the upright Affirm biopsy device and tomosynthesis images were obtained of the calcifications in the right upper breast, 11:00, mid. After the appropriate images were obtained, the breast lesion was targeted from the digital tomosynthesis images with computer assistance. Z position safety calculations were calculated and confirmed. The patient was prepped with sterile technique. 1% lidocaine was instilled to anesthetize the skin and 2% lidocaine with epinephrine was instilled to anesthetize the area surrounding the targeted lesion. A tiny incision was made in the skin with a scalpel. A 9-gauge Brevera biopsy probe was inserted through the incision to the coordinates of the lesion derived from the tomosynthesis calculations. Tomosynthesis images were obtained to confirm accurate positioning of the biopsy probe. A total of 2 tissue specimens were extracted. The tissue specimens were radiographed using magnification technique. Specimen Radiograph: Microcalcifications were present in 2 cores. A TOPHAT clip was placed at the biopsy site. Post-biopsy unilateral mammogram demonstrated the clips in the expected location. Clips are  by 1.5 cm in the transverse dimension. Buckle clip is located 3.2 cm from the previously placed cork clip.. A few residual calcifications are present. Expected postbiopsy changes are present with no significant post procedure hematoma identified. Breast Density C: The breasts are heterogeneously dense, which may obscure small masses. Pressure was applied to the biopsy site until all bleeding had stopped. The skin incision was approximated with steristrips. An ice pack was applied to the site. Instructions for post-biopsy care and follow-up  were reviewed with the patient and a copy was given to her. The patient tolerated the procedure well and left the department in stable condition. Upon completion of the procedure, the specimens were submitted to Pathology for histologic analysis. ----------------------------------------    IMPRESSION: Successful stereotactic core biopsy of the right breast with clip placement as detailed above x2. Pathology is currently pending. ASSESSMENT: BIRADS - Post procedure mammogram for marker placement. This exam was interpreted by Jefry Ragsdale M.D. [facility], [place], [address] This report electronically signed by Dr. Jefry Ragsdale 6/3/2025 11:26 AM    DIAG MAMM,DIGITAL,UNI RT CLIP PLCMT (CPT=77065)  Addendum Date: 6/6/2025  DATE OF SERVICE: 06.03.2025 FINAL PATHOLOGIC DIAGNOSIS Right breast 12:00 mid depth stereotactic core biopsy (buckle): Atypical ductal hyperplasia. Right breast 11:00 stereotactic core biopsy (top hat): Columnar cell change with associated calcifications. These results are benign and concordant. A breast surgical consultation is recommended. These results will be communicated to the patient by our breast care coordinator. This can be localized under stereotactic guidance along with additional area of biopsy proven atypia more posteriorly (cork clip). This report electronically signed by Dr. Jefry Ragsdale 6/6/2025 8:44 AM    Result Date: 6/6/2025  DATE OF SERVICE: 06.03.2025 EXAM: DIAG MAMM,DIGITAL,UNI RT CLIP PLCMT (CPT=77065), BREAST STEREO BIOPSY 2 SITES (CPT=19081/10370) HISTORY: 2 site right breast biopsy ---------------------------------------- PROCEDURE: After explaining the risks, benefits, and alternatives to the patient, informed, written and verbal consent was obtained. Timeout was performed. Right breast 12:00 mid buckle clip: The patient was then positioned within the upright Affirm biopsy device and tomosynthesis images were obtained of the  calcifications in the right upper breast, 12:00, mid. After the appropriate images were obtained, the breast lesion was targeted from the digital tomosynthesis images with computer assistance. Z position safety calculations were calculated and confirmed. The patient was prepped with sterile technique. 1% lidocaine was instilled to anesthetize the skin and 2% lidocaine with epinephrine was instilled to anesthetize the area surrounding the targeted lesion. A tiny incision was made in the skin with a scalpel. A 9-gauge Brevera biopsy probe was inserted through the incision to the coordinates of the lesion derived from the tomosynthesis calculations. Tomosynthesis images were obtained to confirm accurate positioning of the biopsy probe. A total of 2 tissue specimens were extracted. The tissue specimens were radiographed using magnification technique. Specimen Radiograph: Microcalcifications were present in 2 cores. A buckle clip was placed at the biopsy site. Right breast 11:00 mid ectopic clip: The patient was then positioned within the upright Affirm biopsy device and tomosynthesis images were obtained of the calcifications in the right upper breast, 11:00, mid. After the appropriate images were obtained, the breast lesion was targeted from the digital tomosynthesis images with computer assistance. Z position safety calculations were calculated and confirmed. The patient was prepped with sterile technique. 1% lidocaine was instilled to anesthetize the skin and 2% lidocaine with epinephrine was instilled to anesthetize the area surrounding the targeted lesion. A tiny incision was made in the skin with a scalpel. A 9-gauge Brevera biopsy probe was inserted through the incision to the coordinates of the lesion derived from the tomosynthesis calculations. Tomosynthesis images were obtained to confirm accurate positioning of the biopsy probe. A total of 2 tissue specimens were extracted. The tissue specimens were  radiographed using magnification technique. Specimen Radiograph: Microcalcifications were present in 2 cores. A TOPHAT clip was placed at the biopsy site. Post-biopsy unilateral mammogram demonstrated the clips in the expected location. Clips are  by 1.5 cm in the transverse dimension. Buckle clip is located 3.2 cm from the previously placed cork clip.. A few residual calcifications are present. Expected postbiopsy changes are present with no significant post procedure hematoma identified. Breast Density C: The breasts are heterogeneously dense, which may obscure small masses. Pressure was applied to the biopsy site until all bleeding had stopped. The skin incision was approximated with steristrips. An ice pack was applied to the site. Instructions for post-biopsy care and follow-up were reviewed with the patient and a copy was given to her. The patient tolerated the procedure well and left the department in stable condition. Upon completion of the procedure, the specimens were submitted to Pathology for histologic analysis. ----------------------------------------    IMPRESSION: Successful stereotactic core biopsy of the right breast with clip placement as detailed above x2. Pathology is currently pending. ASSESSMENT: BIRADS - Post procedure mammogram for marker placement. This exam was interpreted by Jefry Ragsdale M.D. [facility] [place], [place], [address] This report electronically signed by Dr. Jefry Ragsdale 6/3/2025 11:26 AM    RUDY DIAG MAMM, DIGITAL BILAT (CPT=77066)  Result Date: 5/15/2025  DATE OF SERVICE: 05.15.2025 BILATERAL DIAGNOSTIC MAMMOGRAM WITH CAD AND TOMOSYNTHESIS AND BILATERAL BREAST ULTRASOUND CLINICAL INDICATION: Follow-up bilateral breast asymmetries and right breast calcifications. COMPARISON: All prior mammograms dating back to None TECHNIQUE: Bilateral diagnostic digital mammographic views. Images were checked with the [employer] CAD system. Bilateral  breast ultrasound also performed. ---------------------------------------------------------------------------------------- ---------------------------------------- FINDINGS: Breast Density C: The breasts are heterogeneously dense, which may obscure small masses. MAMMOGRAM: Persistent heterogeneous microcalcifications within the central upper right breast mid depth. Calcifications span 3 cm. 2 additional smaller groupings of microcalcifications within the upper right breast at more anterior depth. Persistent oval asymmetry within the upper right breast at mid depth. Persistent oval asymmetries within the outer and inner left breast at anterior and mid depth. ULTRASOUND: Right breast: -Targeted ultrasound 11:00 upper outer retroareolar right breast demonstrates an oval hypoechoic lesion with slight irregular border measuring 9 x 0.4 x 0.6 cm. No internal vascularity. -Targeted ultrasound to the 9:00 inner left breast 2 cm from the nipple demonstrates an oval hypoechoic well-circumscribed lesion measuring 0.8 x 0.4 x 0.5 cm. No internal vascularity. -At the upper inner 10:00 left breast 3 cm from the nipple there is an oval well-circumscribed hypoechoic lesion measuring 0.7 x 0.3 x 0.6 cm. No internal vascularity. -Targeted ultrasound to the upper outer 2:00 left breast 9 cm from the nipple demonstrates a benign intramammary lymph node measuring 0.6 x 0.6 x 0.3 cm. No internal vascularity. -Targeted ultrasound to the 2:00 left breast 7 cm from the nipple demonstrates an oval hypoechoic lesion with slight radial border measuring 1.0 x 0.6 x 0.9 cm. No internal vascularity. -At the 2:00 left breast 5 cm from the nipple there is an oval hypoechoic well-circumscribed lesion measuring 0.7 x 0.3 x 0.7 cm. No internal vascularity. -Oval hypoechoic lesion at the lower outer 5:00 left breast 3 cm from the nipple with slight irregular border measuring 0.5 x 0.3 x 0.7 cm. No internal vascularity. Bilateral axillae appear  unremarkable. ---------------------------------------------------------------------------------------- ---------------------------------------- IMPRESSION AND RECOMMENDATION: 1. Heterogeneous microcalcifications in the central upper right breast. Further evaluation with stereotactic biopsy of the larger grouping of microcalcifications at mid depth recommended. Additional 2 groupings of calcifications within the upper right breast at anterior depth recommended for 6 month follow-up diagnostic mammogram, pending biopsy results. 2. Indeterminate hypoechoic lesion with irregular border within the 11:00 outer retroareolar right breast. Further evaluation with ultrasound-guided biopsy recommended. 3. Indeterminate hypoechoic lesion with irregular border within the 5:00 left breast 3 cm from the nipple. Further evaluation with ultrasound-guided biopsy is recommended. 4. Additional well-circumscribed oval hypoechoic lesions are seen within the left breast. These lesions favor benign process, given morphology. Six-month follow-up diagnostic left breast mammogram and ultrasound recommended, pending biopsy results. Patient and her mother were informed of the examination results and recommendations upon completion of the study by Dr. Rosa. ANY FURTHER EVALUATION SHOULD BE BASED ON CLINICAL ASSESSMENT. ---------------------------------------------------------------------------------------- ---------------------------------------- BI-RADS Final Assessment Category 4: Suspicious. Management Recommendation: Biopsy should be performed in the absence of clinical contraindication. The Department of radiology will coordinate scheduling the biopsy with the patient. ---------------------------------------------------------------------------------------- ---------------------------------------------------------------------------------------- Risk Assessment: A quantitative breast cancer risk assessment was performed from patient  provided data which shows the patient's density adjusted estimated lifetime risk of breast cancer is NOT high-risk. [facility], [place], [address] ---------------------------------------------------------------------------------------- ---------------------------------------- This report electronically signed by Dr. Joanne Rosa 5/15/2025 12:05 PM    US BREAST BILATERAL LIMITED (CPT=76642X2)  Result Date: 5/15/2025  DATE OF SERVICE: 05.15.2025 BILATERAL DIAGNOSTIC MAMMOGRAM WITH CAD AND TOMOSYNTHESIS AND BILATERAL BREAST ULTRASOUND CLINICAL INDICATION: Follow-up bilateral breast asymmetries and right breast calcifications. COMPARISON: All prior mammograms dating back to None TECHNIQUE: Bilateral diagnostic digital mammographic views. Images were checked with the [employer] CAD system. Bilateral breast ultrasound also performed. ---------------------------------------------------------------------------------------- ---------------------------------------- FINDINGS: Breast Density C: The breasts are heterogeneously dense, which may obscure small masses. MAMMOGRAM: Persistent heterogeneous microcalcifications within the central upper right breast mid depth. Calcifications span 3 cm. 2 additional smaller groupings of microcalcifications within the upper right breast at more anterior depth. Persistent oval asymmetry within the upper right breast at mid depth. Persistent oval asymmetries within the outer and inner left breast at anterior and mid depth. ULTRASOUND: Right breast: -Targeted ultrasound 11:00 upper outer retroareolar right breast demonstrates an oval hypoechoic lesion with slight irregular border measuring 9 x 0.4 x 0.6 cm. No internal vascularity. -Targeted ultrasound to the 9:00 inner left breast 2 cm from the nipple demonstrates an oval hypoechoic well-circumscribed lesion measuring 0.8 x 0.4 x 0.5 cm. No internal vascularity. -At the upper inner 10:00 left breast 3 cm  from the nipple there is an oval well-circumscribed hypoechoic lesion measuring 0.7 x 0.3 x 0.6 cm. No internal vascularity. -Targeted ultrasound to the upper outer 2:00 left breast 9 cm from the nipple demonstrates a benign intramammary lymph node measuring 0.6 x 0.6 x 0.3 cm. No internal vascularity. -Targeted ultrasound to the 2:00 left breast 7 cm from the nipple demonstrates an oval hypoechoic lesion with slight radial border measuring 1.0 x 0.6 x 0.9 cm. No internal vascularity. -At the 2:00 left breast 5 cm from the nipple there is an oval hypoechoic well-circumscribed lesion measuring 0.7 x 0.3 x 0.7 cm. No internal vascularity. -Oval hypoechoic lesion at the lower outer 5:00 left breast 3 cm from the nipple with slight irregular border measuring 0.5 x 0.3 x 0.7 cm. No internal vascularity. Bilateral axillae appear unremarkable. ---------------------------------------------------------------------------------------- ---------------------------------------- IMPRESSION AND RECOMMENDATION: 1. Heterogeneous microcalcifications in the central upper right breast. Further evaluation with stereotactic biopsy of the larger grouping of microcalcifications at mid depth recommended. Additional 2 groupings of calcifications within the upper right breast at anterior depth recommended for 6 month follow-up diagnostic mammogram, pending biopsy results. 2. Indeterminate hypoechoic lesion with irregular border within the 11:00 outer retroareolar right breast. Further evaluation with ultrasound-guided biopsy recommended. 3. Indeterminate hypoechoic lesion with irregular border within the 5:00 left breast 3 cm from the nipple. Further evaluation with ultrasound-guided biopsy is recommended. 4. Additional well-circumscribed oval hypoechoic lesions are seen within the left breast. These lesions favor benign process, given morphology. Six-month follow-up diagnostic left breast mammogram and ultrasound recommended, pending biopsy  results. Patient and her mother were informed of the examination results and recommendations upon completion of the study by Dr. Rosa. ANY FURTHER EVALUATION SHOULD BE BASED ON CLINICAL ASSESSMENT. ---------------------------------------------------------------------------------------- ---------------------------------------- BI-RADS Final Assessment Category 4: Suspicious. Management Recommendation: Biopsy should be performed in the absence of clinical contraindication. The Department of radiology will coordinate scheduling the biopsy with the patient. ---------------------------------------------------------------------------------------- ---------------------------------------------------------------------------------------- Risk Assessment: A quantitative breast cancer risk assessment was performed from patient provided data which shows the patient's density adjusted estimated lifetime risk of breast cancer is NOT high-risk. [facility], [place], [address] ---------------------------------------------------------------------------------------- ---------------------------------------- This report electronically signed by Dr. Joanne Rosa 5/15/2025 12:05 PM    I personally reviewed the above images with them  I explained which areas I recommended excision    IMPRESSION:    ADH 2 sites right breast  One marked with cork clip  Second marked with a buckle clip    PLAN:    Right breast lumpectomy x 2 with wire localization   Details of surgery reviewed

## 2025-07-21 NOTE — BRIEF OP NOTE
Pre-Operative Diagnosis: ATYPICAL DUCTAL HYPERPLASIA RIGHT BREAST     Post-Operative Diagnosis: ATYPICAL DUCTAL HYPERPLASIA RIGHT BREAST      Procedure Performed:   RIGHT BREAST LUMPECTOMY WITH WIRE LOCALIZATION TWO WIRES (TWO SITES)    Surgeons and Role:     * Juno Reza MD - Primary    Assistant(s):  Surgical Assistant.: Krista Pope, LILLIE     Surgical Findings: right breast mass     Specimen: right breast lumpectomy     Estimated Blood Loss: Blood Output: 5 mL (7/21/2025  1:18 PM)        Juno Reza MD  7/21/2025  1:24 PM

## 2025-07-21 NOTE — OPERATIVE REPORT
[facility]    PATIENT'S NAME: AIRAM MONTAGUE   ATTENDING PHYSICIAN: Juno Reza M.D.   OPERATING PHYSICIAN: Juno Reza M.D.   PATIENT ACCOUNT#:   [id number]    LOCATION:  [facility] 9 EDWP 10  MEDICAL RECORD #:   [id number]       YOB: 1985  ADMISSION DATE:       07/21/2025      OPERATION DATE:  07/21/2025    OPERATIVE REPORT    PREOPERATIVE DIAGNOSIS:  Atypical ductal hyperplasia, 2 sites, right superior breast.  POSTOPERATIVE DIAGNOSIS:  Atypical ductal hyperplasia, 2 sites, right superior breast.  PROCEDURE:  Bracketed wire localization and lumpectomy, right breast; intraoperative specimen mammogram.    ASSISTANT:  LILLIE Pettit.  She assisted by prepping, draping, retracting, and suturing.    ANESTHESIA:  General.    OPERATIVE TECHNIQUE:  The patient was brought in the operating room, placed on the operating table in supine position.  Inhalational anesthesia provided by the attending anesthesiologist.  The right breast was prepped in sterile fashion.  Lidocaine 1% and 0.5% Marcaine was used as a local anesthetic.  The 2 wires entered the breast in the superior portion of the breast.  The area was at approximately the 12 o'clock position.  I made an elliptical skin incision across the superior breast essentially from the 11 to 1 o'clock through 12, and doing this, I was able to pull in both wires into the skin ellipse.  I used the wire as a guide and excised the targeted area with a normal rim of breast tissue toward the nipple, away from the nipple, medial, lateral, and deep.  I placed a long stitch at the towards-nipple margin, a short stitch at the away-nipple margin, skin marks anterior.  I performed a mammogram of the specimen in the operating room which confirmed the successful excision of the targeted area.  The specimen was sent to Pathology for histological examination.  Hemostasis was obtained with electrocautery.  The wound was closed in layers using  absorbable sutures.  Steri-Strips, sterile dressing were provided.  The patient tolerated the procedure well.  She was taken to recovery room for observation.    Dictated By Juno Reza M.D.  d: 07/21/2025 13:30:02  t: 07/21/2025 15:33:31  Psychiatric [id number]  [facility]/

## 2025-07-21 NOTE — ANESTHESIA PROCEDURE NOTES
Airway  Date/Time: 7/21/2025 12:55 PM  Reason: elective      General Information and Staff   Patient location during procedure: OR  Anesthesiologist: Christophe Renteria MD  Performed: anesthesiologist   Performed by: Christophe Renteria MD  Authorized by: Christophe Renteria MD        Indications and Patient Condition  Indications for airway management: anesthesia  Sedation level: deep      Preoxygenated: yesPatient position: sniffing    Mask difficulty assessment: 1 - vent by mask    Final Airway Details    Final airway type: supraglottic airway      Successful airway: classic  Size: 3     Number of attempts at approach: 1

## 2025-07-21 NOTE — ANESTHESIA PREPROCEDURE EVALUATION
PRE-OP EVALUATION    Patient Name: Aliya Mata    Admit Diagnosis: ATYPICAL DUCTAL HYPERPLASIA RIGHT BREAST    Pre-op Diagnosis: ATYPICAL DUCTAL HYPERPLASIA RIGHT BREAST    RIGHT BREAST LUMPECTOMY WITH WIRE LOCALIZATION TWO WIRES (TWO SITES)    Anesthesia Procedure: RIGHT BREAST LUMPECTOMY WITH WIRE LOCALIZATION TWO WIRES (TWO SITES)    Surgeons and Role:     * Juno Reza MD - Primary    Pre-op vitals reviewed.  Temp: 97.5 °F (36.4 °C)  Pulse: 106  Resp: 16  BP: 119/86  SpO2: 100 %  Body mass index is 15.44 kg/m².    Current medications reviewed.  Hospital Medications:  Current Medications[1]    Outpatient Medications:   Prescriptions Prior to Admission[2]    Allergies: Patient has no known allergies.      Anesthesia Evaluation    Patient summary reviewed.    Anesthetic Complications           GI/Hepatic/Renal                                 Cardiovascular        Exercise tolerance: good     MET: >4                                           Endo/Other      (+) diabetes  type 2,                          Pulmonary      (+) asthma                     Neuro/Psych                                  Past Surgical History[3]  Social Hx on file[4]  History   Drug Use Unknown     Available pre-op labs reviewed.               Airway      Mallampati: II  Mouth opening: >3 FB  TM distance: > 6 cm   Cardiovascular    Cardiovascular exam normal.         Dental             Pulmonary    Pulmonary exam normal.                 Other findings        ASA: 2   Plan: general  NPO status verified and patient meets guidelines.          Plan/risks discussed with: patient            Present on Admission:  **None**               [1]  • acetaminophen (Tylenol Extra Strength) tab 1,000 mg  1,000 mg Oral Once   • scopolamine (Transderm-Scop) 1 MG/3DAYS patch 1 patch  1 patch Transdermal Once   • glucose (Dex4) 15 GM/59ML oral liquid 15 g  15 g Oral Q15 Min PRN    Or   • glucose (Glutose) 40% oral gel 15 g  15 g Oral Q15 Min PRN    Or   •  glucose-vitamin C (Dex-4) chewable tab 4 tablet  4 tablet Oral Q15 Min PRN    Or   • dextrose 50% injection 50 mL  50 mL Intravenous Q15 Min PRN    Or   • glucose (Dex4) 15 GM/59ML oral liquid 30 g  30 g Oral Q15 Min PRN    Or   • glucose (Glutose) 40% oral gel 30 g  30 g Oral Q15 Min PRN    Or   • glucose-vitamin C (Dex-4) chewable tab 8 tablet  8 tablet Oral Q15 Min PRN   • lactated ringers infusion   Intravenous Continuous   • ceFAZolin (Ancef) 2g in 10mL IV syringe premix  2 g Intravenous Once   [2]  Medications Prior to Admission   Medication Sig Dispense Refill Last Dose/Taking   • semaglutide 8 MG/3ML Subcutaneous Solution Pen-injector Inject 2 mg into the skin once a week. Injection on Mondays.   7/14/2025   • JARDIANCE 25 MG Oral Tab Take 1 tablet (25 mg total) by mouth daily.   7/17/2025   • Accu-Chek Softclix Lancets Does not apply Misc daily.   Taking   • Blood Glucose Monitoring Suppl (ACCU-CHEK GUIDE) w/Device Does not apply Kit 1 strip by In Vitro route daily.   Taking   • Montelukast Sodium (SINGULAIR OR) Take by mouth.   7/20/2025 at  8:00 AM   • METFORMIN HCL ER OR Take 500 mg by mouth in the morning and 500 mg before bedtime.   7/20/2025 at  8:00 AM   [3]  Past Surgical History:  Procedure Laterality Date   • Anesth,corneal transplant     [4]  Social History  Socioeconomic History   • Marital status: Single   Tobacco Use   • Smoking status: Never   • Smokeless tobacco: Never   Vaping Use   • Vaping status: Never Used   Substance and Sexual Activity   • Alcohol use: Never   • Drug use: Never

## (undated) DEVICE — SLEEVE COMPR MD KNEE LEN SGL USE KENDALL SCD

## (undated) DEVICE — SUT COAT VCRL + 2-0 18IN ABSRB UD ANTIBACT

## (undated) DEVICE — Device

## (undated) DEVICE — GLOVE SUR 8 SENSICARE PI PIP CRM PWD F

## (undated) DEVICE — PACK CDS BREAST-HERNIA-PORT

## (undated) DEVICE — GOWN SUR 2XL LEV 4 BLU W/ WHT V NK BND AERO

## (undated) DEVICE — SOLUTION IRRIG 1000ML 0.9% NACL USP BTL

## (undated) DEVICE — COVER LT HNDL RIG FOR SUR CAM DISP

## (undated) NOTE — LETTER
OUTSIDE TESTING RESULT REQUEST     IMPORTANT: FOR YOUR IMMEDIATE ATTENTION  Please FAX all test results listed below to: [phone]         * * * * If testing is NOT complete, arrange with patient A.S.A.P. * * * *      Patient Name: Aliya Mata  Surgery Date: 2025  Medical Record: [id number]  CSN: [id number]  : 1985 - A: 40 y     Sex: female  Surgeon(s):  Juno Reza MD  Procedure: RIGHT BREAST LUMPECTOMY WITH WIRE LOCALIZATION TWO WIRES TWO SITE  Anesthesia Type: General     Surgeon: Juno Reza MD     The following Testing and Time Line are REQUIRED PER ANESTHESIA     EKG READ AND SIGNED WITHIN   90 days  BMP (requires 4 hour fast) within  90 days      Thank You,   Sent by: LAURIE Lares

## (undated) NOTE — LETTER
Patient Name: Aliya Mata  Surgery Date: 7/21/2025  Medical Record: [id number] CSN: [id number]      Surgeon(s):  Juno Reza MD  Consent Procedure: RIGHT BREAST LUMPECTOMY WITH WIRE LOCALIZATION TWO WIRES (TWO SITES)  Anesthesia Type: General     Attention Emi: Please have  sign form and fax  to [phone].      Thanks, P.A.T. dept